# Patient Record
Sex: FEMALE | Race: OTHER | NOT HISPANIC OR LATINO | URBAN - METROPOLITAN AREA
[De-identification: names, ages, dates, MRNs, and addresses within clinical notes are randomized per-mention and may not be internally consistent; named-entity substitution may affect disease eponyms.]

---

## 2024-09-14 ENCOUNTER — EMERGENCY (EMERGENCY)
Facility: HOSPITAL | Age: 39
LOS: 0 days | Discharge: HOME | End: 2024-09-15
Attending: STUDENT IN AN ORGANIZED HEALTH CARE EDUCATION/TRAINING PROGRAM
Payer: COMMERCIAL

## 2024-09-14 VITALS
OXYGEN SATURATION: 99 % | WEIGHT: 149.91 LBS | TEMPERATURE: 98 F | RESPIRATION RATE: 18 BRPM | HEART RATE: 103 BPM | DIASTOLIC BLOOD PRESSURE: 79 MMHG | SYSTOLIC BLOOD PRESSURE: 122 MMHG

## 2024-09-14 DIAGNOSIS — T44.5X5A ADVERSE EFFECT OF PREDOMINANTLY BETA-ADRENORECEPTOR AGONISTS, INITIAL ENCOUNTER: ICD-10-CM

## 2024-09-14 DIAGNOSIS — R21 RASH AND OTHER NONSPECIFIC SKIN ERUPTION: ICD-10-CM

## 2024-09-14 DIAGNOSIS — T78.2XXA ANAPHYLACTIC SHOCK, UNSPECIFIED, INITIAL ENCOUNTER: ICD-10-CM

## 2024-09-14 DIAGNOSIS — R07.0 PAIN IN THROAT: ICD-10-CM
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
## 2024-09-14 PROCEDURE — 99285 EMERGENCY DEPT VISIT HI MDM: CPT

## 2024-09-14 PROCEDURE — 99284 EMERGENCY DEPT VISIT MOD MDM: CPT

## 2024-09-14 RX ORDER — DIPHENHYDRAMINE HCL 50 MG
50 CAPSULE ORAL ONCE
Refills: 0 | Status: COMPLETED | OUTPATIENT
Start: 2024-09-14 | End: 2024-09-14

## 2024-09-14 RX ORDER — FAMOTIDINE 10 MG/ML
20 INJECTION INTRAVENOUS ONCE
Refills: 0 | Status: COMPLETED | OUTPATIENT
Start: 2024-09-14 | End: 2024-09-14

## 2024-09-14 RX ADMIN — Medication 50 MILLIGRAM(S): at 23:44

## 2024-09-14 RX ADMIN — FAMOTIDINE 20 MILLIGRAM(S): 10 INJECTION INTRAVENOUS at 23:44

## 2024-09-14 NOTE — ED PROVIDER NOTE - NSFOLLOWUPINSTRUCTIONS_ED_ALL_ED_FT
Follow-up with allergy referral 1 to 3 days.  Follow-up with primary care doctor in 1 to 3 days.  If you develop recurring symptoms such as throat irritation, throat swelling, tongue swelling, difficulty breathing, use EpiPen as directed and return immediately to emergency department for reevaluation.    Anaphylactic Reaction, Adult  An anaphylactic reaction is a sudden and very bad allergic reaction. It must be treated right away.    What are the causes?  Being exposed to things you are allergic to (allergens). These may be:  Foods, like peanuts, wheat, shellfish, milk, or eggs.  Medicines.  Insect bites or stings.  Blood or parts of blood that have been given to you for treatment (transfusions).  Chemicals. These include latex and dyes used in food and medical tests.  What increases the risk?  Having allergies.  Having had this kind of reaction before.  Having a family history of this kind of reaction.  Having asthma or eczema.  What are the signs or symptoms?  Feeling warm in the face (flushed). Your face may turn red.  Hives. These are itchy, red, swollen areas of skin.  Swelling of the eyes, lips, face, mouth, tongue, or throat.  Trouble breathing or speaking.  Trouble swallowing.  Feeling dizzy or fainting.  Pain or cramps in your belly (abdomen).  Vomiting or watery poop (diarrhea).  How is this treated?  A person using an auto-injector pen in the thigh.  If you are having a bad allergic reaction, you should:  Give yourself a shot using a device filled with epinephrine (auto-injector pen). Your doctor will teach you how to use the pen.  Call for help. If you use an auto-injector pen, you must still get treated in the hospital. You may be given:  Medicines.  Oxygen.  Fluids in an IV tube.  Follow these instructions at home:  Safety    Always keep an auto-injector pen with you. If you can, carry two pens. Use the pen as told by your doctor. After you use the pen, get more medicine for it right away.  Do not drive after you have a reaction. Wait until your doctor says it is safe to drive.  Make sure that you, the people who live with you, and your employer know:  What you are allergic to.  How to use your auto-injector pen.  If told by your doctor, wear a bracelet or necklace that says you have an allergy.  Learn the signs of a very bad allergic reaction.  Work with your doctors to make a plan for what to do if you have a very bad reaction.  General instructions    Take over-the-counter and prescription medicines only as told by your doctor.  If you have a rash or itchy skin:  Use an allergy medicine as told by your doctor.  Put cold, wet cloths on your skin.  Take a cool bath or shower. Do not use hot water.  Tell all of your doctors that you have an allergy.  How is this prevented?  Avoid things that have given you a bad reaction before.  Tell your  about your allergy when you go out to eat. If you are not sure if your meal contains food that you are allergic to, ask your  before you eat it.  Where to find more information  American Academy of Allergy, Asthma, and Immunology (AAAAI): aaaai.org  Contact a doctor if:  Your auto-injector pen is .  Get help right away if:  You have a bad allergic reaction.  You use your auto-injector pen. You must go to the hospital even if the medicine seems to be working.  These symptoms may be an emergency. Use the auto-injector pen right away. Then call 911.  Do not wait to see if the symptoms will go away.  Do not drive yourself to the hospital.  This information is not intended to replace advice given to you by your health care provider. Make sure you discuss any questions you have with your health care provider.

## 2024-09-14 NOTE — ED PROVIDER NOTE - ATTENDING APP SHARED VISIT CONTRIBUTION OF CARE
39-year-old female no past medical history presents sent from urgent care after receiving IM epinephrine for allergic reaction.  Patient states earlier today developed itchy rash to her body with throat irritation.  Patient went to urgent care and was given IM epi prednisone 2 Benadryl Pepcid and they came in here.  Epi was given at 8 PM.  start of the symptom was around 4.  CONSTITUTIONAL: well developed; in no acute distress  HEAD: normocephalic; atraumatic  EYES: no conjunctival injection, no scleral icterus  ENT: no nasal discharge; airway clear.  NECK: supple; non tender.  CARD: warm and well perfused, not tachycardic  RESP: breathing comfortably on RA, speaking in full sentences w/o distress  Abdomen: Soft, None Tender, None Distended   EXT: moving all extremities spontaneously, normal ROM.  SKIN: warm and dry, no lesions noted  NEURO: alert, oriented, motor and sensory grossly intact, speech nonslurred  PSYCH: calm, cooperative  will monitor pt for delayed reaction

## 2024-09-14 NOTE — ED PROVIDER NOTE - CLINICAL SUMMARY MEDICAL DECISION MAKING FREE TEXT BOX
39-year-old female no past medical history presents sent from urgent care after receiving IM epinephrine for allergic reaction.  Patient states earlier today developed itchy rash to her body with throat irritation.  Patient went to urgent care and was given IM epi prednisone 2 Benadryl Pepcid and they came in here.  Epi was given at 8 PM.  start of the symptom was around 4. monitored for 4 hours given another dose of Pepcid and pt felt better and discharged with return precautions  pt already has epipen home and immunologist follow up

## 2024-09-14 NOTE — ED PROVIDER NOTE - PATIENT PORTAL LINK FT
You can access the FollowMyHealth Patient Portal offered by [facility] by registering at the following website: [email or web address]. By joining [facility]’s FollowMyHealth portal, you will also be able to view your health information using other applications (apps) compatible with our system.

## 2024-09-14 NOTE — ED ADULT TRIAGE NOTE - CHIEF COMPLAINT QUOTE
pt biba for allergic reaction. pt sts she broke into hives all over her body and she felt like her throat was getting tight. she went to urgent care and received 0.3 mg IM epi and 60 mg of oral prednisone and 50 mg of benadryl and 20 mg of pepcid. pt sts she feels much better now.

## 2024-09-14 NOTE — ED PROVIDER NOTE - PHYSICAL EXAMINATION
Vital Signs: I have reviewed the initial vital signs.  CONSTITUTIONAL: Pt in no acute distress   SKIN: +scattered areas of faint erythema to b/l UE and LE.   HEAD: Normocephalic; atraumatic.  EYES: PERRL, EOM intact; conjunctiva and sclera clear.  ENT: No nasal discharge; airway clear. Oropharynx normal, no tongue swelling or angioedema.  NECK: Supple; non tender.  CARD: S1, S2 normal; no murmurs, gallops, or rubs. +tachycardia  RESP: CTAB. No wheezes, rales or rhonchi.  MSK: Normal ROM. No clubbing, cyanosis or edema.

## 2024-09-14 NOTE — ED ADULT NURSE NOTE - NSFALLUNIVINTERV_ED_ALL_ED
Bed/Stretcher in lowest position, wheels locked, appropriate side rails in place/Call bell, personal items and telephone in reach/Instruct patient to call for assistance before getting out of bed/chair/stretcher/Non-slip footwear applied when patient is off stretcher/[place] to call system/Physically safe environment - no spills, clutter or unnecessary equipment/Purposeful proactive rounding/Room/bathroom lighting operational, light cord in reach

## 2024-09-14 NOTE — ED PROVIDER NOTE - OBJECTIVE STATEMENT
39-year-old female no past medical history presents sent from urgent care after receiving IM epinephrine for allergic reaction.  Patient states earlier today developed itchy rash to her body with throat irritation.  Patient states she took 50 mg of Benadryl and dose of Pepcid at home, symptoms mildly improved but returned so went to urgent care where she received 0.3 mg IM epinephrine and 60 mg prednisone and referred to ED.  Patient states symptoms have improved, minimal throat irritation currently.  Denies shortness of breath and wheezing.  Denies chest pain.  Patient states similar symptoms happen 1 month ago, but she has not yet seen allergist.

## 2024-09-14 NOTE — ED ADULT NURSE NOTE - OBJECTIVE STATEMENT
Pt presents to the ED complaining of an allergic reaction. Pt states around 1600 she began having hives, throat swelling, and "pin prick feelings" all over he body due to an unknown allergen. Pt states at this time she took benadryl and Pepcid. She then went to  where she received IM Epi and prednisone. Airway clear and patent pt denies any difficulty breathing.

## 2024-09-15 VITALS
TEMPERATURE: 99 F | SYSTOLIC BLOOD PRESSURE: 128 MMHG | OXYGEN SATURATION: 100 % | HEART RATE: 98 BPM | DIASTOLIC BLOOD PRESSURE: 77 MMHG | RESPIRATION RATE: 18 BRPM

## 2024-09-15 RX ORDER — PREDNISONE 10 MG
1 TABLET, DOSE PACK ORAL
Qty: 5 | Refills: 0
Start: 2024-09-15 | End: 2024-09-19